# Patient Record
Sex: MALE | Race: WHITE | URBAN - NONMETROPOLITAN AREA
[De-identification: names, ages, dates, MRNs, and addresses within clinical notes are randomized per-mention and may not be internally consistent; named-entity substitution may affect disease eponyms.]

---

## 2017-09-08 ENCOUNTER — OFFICE VISIT (OUTPATIENT)
Dept: OTOLARYNGOLOGY | Facility: OTHER | Age: 62
End: 2017-09-08
Attending: OTOLARYNGOLOGY
Payer: COMMERCIAL

## 2017-09-08 VITALS
BODY MASS INDEX: 24.64 KG/M2 | DIASTOLIC BLOOD PRESSURE: 70 MMHG | OXYGEN SATURATION: 100 % | HEIGHT: 74 IN | WEIGHT: 192 LBS | HEART RATE: 57 BPM | SYSTOLIC BLOOD PRESSURE: 122 MMHG | TEMPERATURE: 96.4 F

## 2017-09-08 DIAGNOSIS — K13.0 LIP LESION: Primary | ICD-10-CM

## 2017-09-08 DIAGNOSIS — Z85.89 HISTORY OF SQUAMOUS CELL CARCINOMA: ICD-10-CM

## 2017-09-08 PROCEDURE — 11441 EXC FACE-MM B9+MARG 0.6-1 CM: CPT | Performed by: OTOLARYNGOLOGY

## 2017-09-08 PROCEDURE — 88305 TISSUE EXAM BY PATHOLOGIST: CPT | Mod: TC | Performed by: OTOLARYNGOLOGY

## 2017-09-08 PROCEDURE — 99203 OFFICE O/P NEW LOW 30 MIN: CPT | Mod: 25 | Performed by: OTOLARYNGOLOGY

## 2017-09-08 PROCEDURE — 40812 EXCISE/REPAIR MOUTH LESION: CPT | Performed by: OTOLARYNGOLOGY

## 2017-09-08 PROCEDURE — 12051 INTMD RPR FACE/MM 2.5 CM/<: CPT | Mod: 59 | Performed by: OTOLARYNGOLOGY

## 2017-09-08 PROCEDURE — 88304 TISSUE EXAM BY PATHOLOGIST: CPT | Mod: TC | Performed by: OTOLARYNGOLOGY

## 2017-09-08 ASSESSMENT — PAIN SCALES - GENERAL: PAINLEVEL: NO PAIN (0)

## 2017-09-08 NOTE — PATIENT INSTRUCTIONS
Thank you for allowing Dr. Bailey and our ENT team to participate in your care.  If you have a scheduling or an appointment question please contact [facility] Unit Coordinator at their direct line [phone].   ALL nursing questions or concerns can be directed to your ENT nurse at: [phone] Nicole Jay        POST PROCEDURE INSTRUCTIONS      For oral lesions, rinse your mouth with a mixture of half water and half hydrogen peroxide 3 times daily, after meals and before bed.       For lip lesions, apply Aquaphor Healing Ointment to the wound 3 times daily after cleaning with above mixture(Unless specified Bacitracin).      You can apply ice to the surgical area to help reduce swelling. (no longer than 20 minutes at a time)       You can use acetaminophen(Tylenol) or the prescription you received for pain.       If you have sutures in place these are dissolvable. They will fall out on their own. DO NOT play with them as this will cause more irritation to the surgical area.       If cautery was used, that is the blackened area you see. This will fade away and can become a white patchy area. This is normal! Continue to clean wound as described above.     SIGNS OF INFECTION ARE:    Redness, swelling, red streaks, pus, drainage, warmth, fever, increased pain, foul smell.     Contact your primary health care provider if you notice any of the warning signs.     FOLLOW - UP    Follow-up as needed in clinic.     Pathology results will be called to you when they are back. This can take approx. 7-10 days.

## 2017-09-08 NOTE — PROGRESS NOTES
"Otolaryngology Consultation    Patient: Jose Angel Mills  : 1955    Patient presents with:  Lesion: Lower lip lesion/Referred by Ambrose Nolan       HPI:  Jose Angel Mills is a 62 year old male seen today for lower lip lesion.  He has a significant hx of squamous cell carcinoma lower lip.   He has been cancer free for 10 years, following resection in [place] with Dr. Tillman whom he states was a dermatologist (describes a MOHS resection).  No history of cervical lymphadenopathy or neck irradiation, nor neck surgery.    Lenin noted some changes in the appearance of his lip over the past 2 months.  No pain or ulceration  No tobacco use    Current Outpatient Rx   Medication Sig Dispense Refill     glucosamine-chondroitin 500-400 MG CAPS per capsule Take 1 capsule by mouth daily       Levothyroxine Sodium (SYNTHROID PO) Take 75 mcg by mouth daily       meclizine 25 MG CHEW Take 25 mg by mouth every 8 hours as needed for dizziness       ranitidine (ZANTAC) 150 MG/10ML syrup Take 150 mg/kg/day by mouth daily         Allergies: Review of patient's allergies indicates not on file.     History reviewed. No pertinent past medical history.    History reviewed. No pertinent surgical history.    ENT family history reviewed    Social History   Substance Use Topics     Smoking status: Never Smoker     Smokeless tobacco: Never Used     Alcohol use No       Review of Systems  ROS: 10 point ROS neg other than the symptoms noted above in the HPI and extremity numbness, memory loss, gastroesophageal reflux disease, eye floaters    Physical Exam  /70 (BP Location: Left arm, Patient Position: Chair, Cuff Size: Adult Regular)  Pulse 57  Temp 96.4  F (35.8  C) (Tympanic)  Ht 6' 2\" (1.88 m)  Wt 192 lb (87.1 kg)  SpO2 100%  BMI 24.65 kg/m2  General - The patient is well nourished and well developed, and appears to have good nutritional status.  Alert and oriented to person and place, answers questions and cooperates " with examination appropriately.   Head and Face - Normocephalic and atraumatic, with no gross asymmetry noted.  The facial nerve is intact, with strong symmetric movements.  Voice and Breathing - The patient was breathing comfortably without the use of accessory muscles. There was no wheezing, stridor, or stertor.  The patients voice was clear and strong, and had appropriate pitch and quality.  Ears -The external auditory canals are patent, the tympanic membranes are intact without effusion, retraction or mass.  Bony landmarks are intact.  Eyes - Extraocular movements intact, and the pupils were reactive to light.  Sclera were not icteric or injected, conjunctiva were pink and moist.  Mouth - Examination of the oral cavity showed pink, healthy oral mucosa.  The tongue was mobile and midline, and the dentition were in good condition but crooked--see below.    There is a well healed right lower lip incision  Right lower mucosal pink lip with a 0.5 cm area of superficial ulceration, similar smaller area along vermilion line.  Irregular dentition along lower incisors near this area.   Throat - The walls of the oropharynx were smooth, pink, moist, symmetric, and had no lesions or ulcerations.  The tonsillar pillars and soft palate were symmetric.  The uvula was midline on elevation.    Neck - Normal midline excursion of the laryngotracheal complex during swallowing.  Full range of motion on passive movement.  Palpation of the occipital, submental, submandibular, internal jugular chain, and supraclavicular nodes did not demonstrate any abnormal lymph nodes or masses.  Palpation of the thyroid was soft and smooth, with no nodules or goiter appreciated.  The trachea was mobile and midline.  Nose - External contour is symmetric, no gross deflection or scars.  Nasal mucosa is pink and moist with no abnormal mucus.  The septum and turbinates were evaluated: non obstructive.  No polyps, masses, or purulence noted on  examination.    Procedure - Oral cavity lesion Removal    Informed consent was discussed and signed, and risks of the procedure were discussed, including bleeding, anesthesia, infection, scar formation, numbness, need for additional surgery, injury to salivary tissue.  I then demarcated the vermilion line and infiltrated the mucosal tissues with 1% lidocaine with 1:200,000 epinephrine.  I placed a 5-0 nylon at the periphery of the vermilion on either side of the vermilion lesion.  I then used a 15-blade to create an thin elliptical incision around the vermilion  lesion.  I then elevated the  ellipse and a thin cuff of underlying normal appearing mucosa and skin with the scalpel.  I proceeded to use a fine iris scissor to undermine the lesion and excise it.   Hemostasis was achieved with direct pressure and silver nitrate cautery.  The total length of the incision was 0.6cm.  The specimen(s) was placed in formalin and sent to pathology.  The mucosal edges were then reapproximated using 4-0 chromic, simple interrupted sutures .    I similarly excised the red mucosal lip lesion and closed in two layers first re-approximated deep mucosa with 4-0 chromic, then superficial mucosa , defect 1.5 cm.  The patient tolerated the procedure without any difficulty.      A/P - This patient has had removal of an oral cavity lesion today.  I have instructed the patient on wound care with 1/2 strength peroxide rinses for 1 week, and  Diet.  Ibuprofen alternated with tylenol prn pain.  The patient will be called with pathology results.    He may require a larger excision with possible flap repair, d/w Lenin today.       Impression and Plan- Jose Angel Mills is a 62 year old male with:    ICD-10-CM    1. Lip lesion K13.0 Surgical pathology exam   2. History of squamous cell carcinoma lower lip Z85.89              Evette Bailey D.O.  Otolaryngology/Head and Neck Surgery  Allergy

## 2017-09-08 NOTE — MR AVS SNAPSHOT
After Visit Summary   9/8/2017    Jose Angel Mills    MRN: [id number]           Patient Information     Date Of Birth          1955        Visit Information        Provider Department      9/8/2017 11:00 AM Evette Bailey MD [facility]        Care Instructions    Thank you for allowing Dr. Bailey and our ENT team to participate in your care.  If you have a scheduling or an appointment question please contact [facility] Unit Coordinator at their direct line [phone].   ALL nursing questions or concerns can be directed to your ENT nurse at: [phone] - Mist[place]        POST PROCEDURE INSTRUCTIONS      For oral lesions, rinse your mouth with a mixture of half water and half hydrogen peroxide 3 times daily, after meals and before bed.       For lip lesions, apply Aquaphor Healing Ointment to the wound 3 times daily after cleaning with above mixture(Unless specified Bacitracin).      You can apply ice to the surgical area to help reduce swelling. (no longer than 20 minutes at a time)       You can use acetaminophen(Tylenol) or the prescription you received for pain.       If you have sutures in place these are dissolvable. They will fall out on their own. DO NOT play with them as this will cause more irritation to the surgical area.       If cautery was used, that is the blackened area you see. This will fade away and can become a white patchy area. This is normal! Continue to clean wound as described above.     SIGNS OF INFECTION ARE:    Redness, swelling, red streaks, pus, drainage, warmth, fever, increased pain, foul smell.     Contact your primary health care provider if you notice any of the warning signs.     FOLLOW - UP    Follow-up as needed in clinic.     Pathology results will be called to you when they are back. This can take approx. 7-10 days.             Follow-ups after your visit        Who to contact     If you have questions or need follow up  "information about today's clinic visit or your schedule please contact [facility] directly at [phone].  Normal or non-critical lab and imaging results will be communicated to you by MyChart, letter or phone within 4 business days after the clinic has received the results. If you do not hear from us within 7 days, please contact the clinic through Appwizhart or phone. If you have a critical or abnormal lab result, we will notify you by phone as soon as possible.  Submit refill requests through goviral or call your pharmacy and they will forward the refill request to us. Please allow 3 business days for your refill to be completed.          Additional Information About Your Visit        MyChart Information     goviral lets you send messages to your doctor, view your test results, renew your prescriptions, schedule appointments and more. To sign up, go to [email or web address] . Click on \"Log in\" on the left side of the screen, which will take you to the Welcome page. Then click on \"Sign up Now\" on the right side of the page.     You will be asked to enter the access code listed below, as well as some personal information. Please follow the directions to create your username and password.     Your access code is: [email or web address]  Expires: 2017 11:38 AM     Your access code will  in 90 days. If you need help or a new code, please call your [facility] clinic or [phone].        Care EveryWhere ID     This is your Care EveryWhere ID. This could be used by other organizations to access your [facility] medical records  [id number]        Your Vitals Were     Pulse Temperature Height Pulse Oximetry BMI (Body Mass Index)       57 96.4  F (35.8  C) (Tympanic) 6' 2\" (1.88 m) 100% 24.65 kg/m2        Blood Pressure from Last 3 Encounters:   17 122/70    Weight from Last 3 Encounters:   17 192 lb (87.1 kg)              Today, you had the following     No orders found for display       Primary " Care Provider    None Specified       No primary provider on file.        Equal Access to Services     RODO DOMINIQUE : Hadii ganga Gale, erica schneider, sagrario rodriguez. So [facility] [phone].    ATENCIÓN: Si habla español, tiene a virk disposición servicios gratuitos de asistencia lingüística. Llame al [phone].    We comply with applicable federal civil rights laws and [place] laws. We do not discriminate on the basis of race, color, national origin, age, disability sex, sexual orientation or gender identity.            Thank you!     Thank you for choosing [facility]  for your care. Our goal is always to provide you with excellent care. Hearing back from our patients is one way we can continue to improve our services. Please take a few minutes to complete the written survey that you may receive in the mail after your visit with us. Thank you!             Your Updated Medication List - Protect others around you: Learn how to safely use, store and throw away your medicines at www.disposemymeds.org.          This list is accurate as of: 9/8/17 11:38 AM.  Always use your most recent med list.                   Brand Name Dispense Instructions for use Diagnosis    glucosamine-chondroitin 500-400 MG Caps per capsule      Take 1 capsule by mouth daily        meclizine 25 MG Chew      Take 25 mg by mouth every 8 hours as needed for dizziness        ranitidine 150 MG/10ML syrup    Zantac     Take 150 mg/kg/day by mouth daily        SYNTHROID PO      Take 75 mcg by mouth daily

## 2017-09-12 LAB — COPATH REPORT: NORMAL
